# Patient Record
Sex: FEMALE | URBAN - METROPOLITAN AREA
[De-identification: names, ages, dates, MRNs, and addresses within clinical notes are randomized per-mention and may not be internally consistent; named-entity substitution may affect disease eponyms.]

---

## 2024-07-01 RX ORDER — CLINDAMYCIN PHOSPHATE 11.9 MG/ML
SOLUTION TOPICAL
COMMUNITY
End: 2024-07-03

## 2024-07-01 RX ORDER — VALACYCLOVIR HYDROCHLORIDE 500 MG/1
500 TABLET, FILM COATED ORAL 2 TIMES DAILY
COMMUNITY

## 2024-07-01 RX ORDER — FLUTICASONE PROPIONATE AND SALMETEROL 113; 14 UG/1; UG/1
POWDER, METERED RESPIRATORY (INHALATION)
COMMUNITY
End: 2024-07-03

## 2024-07-01 RX ORDER — BIMATOPROST 0.1 MG/ML
SOLUTION/ DROPS OPHTHALMIC
COMMUNITY
End: 2024-07-03

## 2024-07-01 RX ORDER — EFAVIRENZ, EMTRICITABINE AND TENOFOVIR DISOPROXIL FUMARATE 600; 200; 300 MG/1; MG/1; MG/1
TABLET, FILM COATED ORAL
COMMUNITY
End: 2024-07-03

## 2024-07-01 RX ORDER — MULTIVITAMIN,THER AND MINERALS
1 TABLET ORAL DAILY
COMMUNITY

## 2024-07-01 RX ORDER — TOPIRAMATE 25 MG/1
25 TABLET ORAL
COMMUNITY

## 2024-07-01 RX ORDER — TOPIRAMATE SPINKLE 25 MG/1
25 CAPSULE ORAL
COMMUNITY
End: 2024-07-03

## 2024-07-01 RX ORDER — LEVOTHYROXINE SODIUM 175 UG/1
1 TABLET ORAL DAILY
COMMUNITY

## 2024-07-01 RX ORDER — FLUTICASONE PROPIONATE 50 MCG
1 SPRAY, SUSPENSION (ML) NASAL DAILY
COMMUNITY
End: 2024-07-03

## 2024-07-01 RX ORDER — METOPROLOL SUCCINATE 50 MG/1
50 TABLET, EXTENDED RELEASE ORAL
COMMUNITY
End: 2024-07-03

## 2024-07-01 RX ORDER — ALPRAZOLAM 0.5 MG/1
1 TABLET ORAL
COMMUNITY

## 2024-07-01 RX ORDER — TOPIRAMATE 100 MG/1
TABLET, FILM COATED ORAL
COMMUNITY
Start: 2020-06-26 | End: 2024-07-03

## 2024-07-01 RX ORDER — SIMVASTATIN 20 MG
20 TABLET ORAL
COMMUNITY

## 2024-07-01 RX ORDER — ASPIRIN 81 MG/1
81 TABLET ORAL
COMMUNITY

## 2024-07-03 ENCOUNTER — OFFICE VISIT (OUTPATIENT)
Dept: GASTROENTEROLOGY | Facility: CLINIC | Age: 64
End: 2024-07-03
Payer: COMMERCIAL

## 2024-07-03 VITALS
WEIGHT: 226 LBS | BODY MASS INDEX: 38.58 KG/M2 | SYSTOLIC BLOOD PRESSURE: 122 MMHG | DIASTOLIC BLOOD PRESSURE: 70 MMHG | HEIGHT: 64 IN | HEART RATE: 52 BPM | TEMPERATURE: 96.8 F | OXYGEN SATURATION: 96 %

## 2024-07-03 DIAGNOSIS — R19.4 CHANGE IN BOWEL HABITS: Primary | ICD-10-CM

## 2024-07-03 DIAGNOSIS — R15.2 FECAL URGENCY: ICD-10-CM

## 2024-07-03 DIAGNOSIS — R12 HEARTBURN: ICD-10-CM

## 2024-07-03 DIAGNOSIS — R13.19 ESOPHAGEAL DYSPHAGIA: ICD-10-CM

## 2024-07-03 DIAGNOSIS — R14.0 BLOATING: ICD-10-CM

## 2024-07-03 PROCEDURE — 99203 OFFICE O/P NEW LOW 30 MIN: CPT | Performed by: PHYSICIAN ASSISTANT

## 2024-07-03 RX ORDER — AZITHROMYCIN 250 MG/1
TABLET, FILM COATED ORAL
COMMUNITY
Start: 2024-05-21

## 2024-07-03 NOTE — PROGRESS NOTES
[facility] - Outpatient Consultation  Suzanne Muse 63 y.o. female MRN: [id number]  Encounter: [id number]          ASSESSMENT AND PLAN:      1. Change in bowel habits  2. Bloating  3. Fecal urgency  - She presents with several months of a change in bowel habits with increased gas, gurgling, fecal urgency, and a mixture of loose bowel movements alternating with constipation as well as foul smelling urine and stool  - UA neg with PCP for UTI  - Trial probiotics  - Discussed possible rifaximin course given she has improved with a low FODMAP diet so her symptoms may be IBS related and seemed to start after having a stomach virus  - Schedule colonoscopy for evaluation given the changes in bowel habits    4. Heartburn  5. Esophageal dysphagia  - Plan for EGD for further evaluation of heartburn with dysphagia at times to pills and solids, also rule out celiac disease given above symptoms  - PPI course pending EGD results      ______________________________________________________________________    HPI:  Suzanne is a 62 yo F with a PMH of HIV, HTN, HLD, presenting for evaluation of change in bowel habits over the past several months with more gurgling and noises in her abdomen with increased gas, episodes of urgency with a mixture between some constipation and loose stools, and a foul smell to her urine and stool.  She reports that she is generally dealt with constipation since 2019 when she had a hernia repair but then noticed these changes several months ago.  She denies any black or bloody bowel movements.  She told her niece about her symptoms and she told her that it sounded like IBS so she told her to try the low FODMAP diet which did really reduce her symptoms but as soon as she ate anything that was not in the low FODMAP side her symptoms recurred.  She reports her last colonoscopy was in 2020 and she is due in 2025 due to a history of polyps.  She has lost 20 pounds but this is  intentional with doing a low FODMAP diet and with portion control.  She also reports history of heartburn and has been having dysphagia at times with a globus sensation.  She never had an upper endoscopy in the past.      REVIEW OF SYSTEMS:    CONSTITUTIONAL: Denies any fever, chills, rigors, and weight loss.  HEENT: No earache or tinnitus. Denies hearing loss or visual disturbances.  CARDIOVASCULAR: No chest pain or palpitations.   RESPIRATORY: Denies any cough, hemoptysis, shortness of breath or dyspnea on exertion.  GASTROINTESTINAL: As noted in the History of Present Illness.   GENITOURINARY: No problems with urination. Denies any hematuria or dysuria.  NEUROLOGIC: No dizziness or vertigo, denies headaches.   MUSCULOSKELETAL: Denies any muscle or joint pain.   SKIN: Denies skin rashes or itching.   ENDOCRINE: Denies excessive thirst. Denies intolerance to heat or cold.  PSYCHOSOCIAL: Denies depression or anxiety. Denies any recent memory loss.       Historical Information   Past Medical History:   Diagnosis Date    Asthma     High blood pressure     High cholesterol     HIV (human immunodeficiency virus infection) (HCC)     Migraine      Past Surgical History:   Procedure Laterality Date    INGUINAL HERNIA REPAIR  2013    2019 had surgery to fix the 2013 surgery     Social History   Social History     Substance and Sexual Activity   Alcohol Use Not Currently    Comment: many years ago     Social History     Substance and Sexual Activity   Drug Use Yes     Social History     Tobacco Use   Smoking Status Former    Types: Cigarettes    Passive exposure: Never   Smokeless Tobacco Never     Family History   Problem Relation Age of Onset    Breast cancer Mother     Stroke Mother     Diabetes Mother     Heart attack Mother        Meds/Allergies       Current Outpatient Medications:     ALPRAZolam (XANAX) 0.5 mg tablet    aspirin (ECOTRIN LOW STRENGTH) 81 mg EC tablet    azithromycin (ZITHROMAX) 250 mg tablet     "bictegravir-emtricitab-tenofovir alafenamide (BIKTARVY) -25 MG tablet    levothyroxine 175 mcg tablet    Metoprolol-HCTZ ER 50-12.5 MG TB24    simvastatin (ZOCOR) 20 mg tablet    Simvastatin 20 MG/5ML SUSP    topiramate (TOPAMAX) 25 mg tablet    valACYclovir (VALTREX) 500 mg tablet    Vitamins/Minerals TABS    No Known Allergies        Objective     Blood pressure 122/70, pulse (!) 52, temperature (!) 96.8 °F (36 °C), temperature source Temporal, height 5' 4\" (1.626 m), weight 103 kg (226 lb), SpO2 96%. Body mass index is 38.79 kg/m².        PHYSICAL EXAM:      General Appearance:   Alert, cooperative, no distress   HEENT:   Normocephalic, atraumatic, anicteric.     Neck:  Supple, symmetrical, trachea midline   Lungs:   Clear to auscultation bilaterally; no rales, rhonchi or wheezing; respirations unlabored    Heart::   Regular rate and rhythm; no murmur, rub, or gallop.   Abdomen:   Soft, non-tender, non-distended; normal bowel sounds; no masses, no organomegaly    Genitalia:   Deferred    Rectal:   Deferred    Extremities:  No cyanosis, clubbing or edema    Pulses:  2+ and symmetric    Skin:  No jaundice, rashes, or lesions    Lymph nodes:  No palpable cervical lymphadenopathy        Lab Results:   No visits with results within 1 Day(s) from this visit.   Latest known visit with results is:   No results found for any previous visit.         Radiology Results:   No results found.  "

## 2024-07-03 NOTE — PATIENT INSTRUCTIONS
Scheduled date of EGD/colonoscopy (as of today):7/29/24  Physician performing EGD/colonoscopy:Hieu  Location of EGD/colonoscopy:[facility]  Desired bowel prep reviewed with patient:Raimundo/Miralax  Instructions reviewed with patient by:Forrest fenton  Clearances:   none

## 2024-07-03 NOTE — LETTER
July 8, 2024     Joe Najjar, MD  [address] [place] [address]    Patient: Suzanne Muse   YOB: 1960   Date of Visit: 7/3/2024       Dear Dr. Najjar:    Thank you for referring Suzanne Muse to me for evaluation. Below are my notes for this consultation.    If you have questions, please do not hesitate to call me. I look forward to following your patient along with you.         Sincerely,        Lynette Unger PA-C        CC: No Recipients    Lynette Unger PA-C  7/3/2024  3:26 PM  Signed  [facility] - Outpatient Consultation  Suzanne Muse 63 y.o. female MRN: [id number]  Encounter: [id number]          ASSESSMENT AND PLAN:      1. Change in bowel habits  2. Bloating  3. Fecal urgency  - She presents with several months of a change in bowel habits with increased gas, gurgling, fecal urgency, and a mixture of loose bowel movements alternating with constipation as well as foul smelling urine and stool  - UA neg with PCP for UTI  - Trial probiotics  - Discussed possible rifaximin course given she has improved with a low FODMAP diet so her symptoms may be IBS related and seemed to start after having a stomach virus  - Schedule colonoscopy for evaluation given the changes in bowel habits    4. Heartburn  5. Esophageal dysphagia  - Plan for EGD for further evaluation of heartburn with dysphagia at times to pills and solids, also rule out celiac disease given above symptoms  - PPI course pending EGD results      ______________________________________________________________________    HPI:  Suzanne is a 64 yo F with a PMH of HIV, HTN, HLD, presenting for evaluation of change in bowel habits over the past several months with more gurgling and noises in her abdomen with increased gas, episodes of urgency with a mixture between some constipation and loose stools, and a foul smell to her urine and stool.  She reports that she is generally dealt with constipation since 2019  when she had a hernia repair but then noticed these changes several months ago.  She denies any black or bloody bowel movements.  She told her niece about her symptoms and she told her that it sounded like IBS so she told her to try the low FODMAP diet which did really reduce her symptoms but as soon as she ate anything that was not in the low FODMAP side her symptoms recurred.  She reports her last colonoscopy was in 2020 and she is due in 2025 due to a history of polyps.  She has lost 20 pounds but this is intentional with doing a low FODMAP diet and with portion control.  She also reports history of heartburn and has been having dysphagia at times with a globus sensation.  She never had an upper endoscopy in the past.      REVIEW OF SYSTEMS:    CONSTITUTIONAL: Denies any fever, chills, rigors, and weight loss.  HEENT: No earache or tinnitus. Denies hearing loss or visual disturbances.  CARDIOVASCULAR: No chest pain or palpitations.   RESPIRATORY: Denies any cough, hemoptysis, shortness of breath or dyspnea on exertion.  GASTROINTESTINAL: As noted in the History of Present Illness.   GENITOURINARY: No problems with urination. Denies any hematuria or dysuria.  NEUROLOGIC: No dizziness or vertigo, denies headaches.   MUSCULOSKELETAL: Denies any muscle or joint pain.   SKIN: Denies skin rashes or itching.   ENDOCRINE: Denies excessive thirst. Denies intolerance to heat or cold.  PSYCHOSOCIAL: Denies depression or anxiety. Denies any recent memory loss.       Historical Information  Past Medical History:   Diagnosis Date   • Asthma    • High blood pressure    • High cholesterol    • HIV (human immunodeficiency virus infection) (HCC)    • Migraine      Past Surgical History:   Procedure Laterality Date   • INGUINAL HERNIA REPAIR  2013    2019 had surgery to fix the 2013 surgery     Social History  Social History     Substance and Sexual Activity   Alcohol Use Not Currently    Comment: many years ago     Social History  "    Substance and Sexual Activity   Drug Use Yes     Social History     Tobacco Use   Smoking Status Former   • Types: Cigarettes   • Passive exposure: Never   Smokeless Tobacco Never     Family History   Problem Relation Age of Onset   • Breast cancer Mother    • Stroke Mother    • Diabetes Mother    • Heart attack Mother        Meds/Allergies      Current Outpatient Medications:   •  ALPRAZolam (XANAX) 0.5 mg tablet  •  aspirin (ECOTRIN LOW STRENGTH) 81 mg EC tablet  •  azithromycin (ZITHROMAX) 250 mg tablet  •  bictegravir-emtricitab-tenofovir alafenamide (BIKTARVY) -25 MG tablet  •  levothyroxine 175 mcg tablet  •  Metoprolol-HCTZ ER 50-12.5 MG TB24  •  simvastatin (ZOCOR) 20 mg tablet  •  Simvastatin 20 MG/5ML SUSP  •  topiramate (TOPAMAX) 25 mg tablet  •  valACYclovir (VALTREX) 500 mg tablet  •  Vitamins/Minerals TABS    No Known Allergies        Objective    Blood pressure 122/70, pulse (!) 52, temperature (!) 96.8 °F (36 °C), temperature source Temporal, height 5' 4\" (1.626 m), weight 103 kg (226 lb), SpO2 96%. Body mass index is 38.79 kg/m².        PHYSICAL EXAM:      General Appearance:   Alert, cooperative, no distress   HEENT:   Normocephalic, atraumatic, anicteric.     Neck:  Supple, symmetrical, trachea midline   Lungs:   Clear to auscultation bilaterally; no rales, rhonchi or wheezing; respirations unlabored    Heart::   Regular rate and rhythm; no murmur, rub, or gallop.   Abdomen:   Soft, non-tender, non-distended; normal bowel sounds; no masses, no organomegaly    Genitalia:   Deferred    Rectal:   Deferred    Extremities:  No cyanosis, clubbing or edema    Pulses:  2+ and symmetric    Skin:  No jaundice, rashes, or lesions    Lymph nodes:  No palpable cervical lymphadenopathy        Lab Results:   No visits with results within 1 Day(s) from this visit.   Latest known visit with results is:   No results found for any previous visit.         Radiology Results:   No results found.  "

## 2024-07-03 NOTE — H&P (VIEW-ONLY)
[facility] - Outpatient Consultation  Suzanne Muse 63 y.o. female MRN: [id number]  Encounter: [id number]          ASSESSMENT AND PLAN:      1. Change in bowel habits  2. Bloating  3. Fecal urgency  - She presents with several months of a change in bowel habits with increased gas, gurgling, fecal urgency, and a mixture of loose bowel movements alternating with constipation as well as foul smelling urine and stool  - UA neg with PCP for UTI  - Trial probiotics  - Discussed possible rifaximin course given she has improved with a low FODMAP diet so her symptoms may be IBS related and seemed to start after having a stomach virus  - Schedule colonoscopy for evaluation given the changes in bowel habits    4. Heartburn  5. Esophageal dysphagia  - Plan for EGD for further evaluation of heartburn with dysphagia at times to pills and solids, also rule out celiac disease given above symptoms  - PPI course pending EGD results      ______________________________________________________________________    HPI:  Suzanne is a 64 yo F with a PMH of HIV, HTN, HLD, presenting for evaluation of change in bowel habits over the past several months with more gurgling and noises in her abdomen with increased gas, episodes of urgency with a mixture between some constipation and loose stools, and a foul smell to her urine and stool.  She reports that she is generally dealt with constipation since 2019 when she had a hernia repair but then noticed these changes several months ago.  She denies any black or bloody bowel movements.  She told her niece about her symptoms and she told her that it sounded like IBS so she told her to try the low FODMAP diet which did really reduce her symptoms but as soon as she ate anything that was not in the low FODMAP side her symptoms recurred.  She reports her last colonoscopy was in 2020 and she is due in 2025 due to a history of polyps.  She has lost 20 pounds but this is  intentional with doing a low FODMAP diet and with portion control.  She also reports history of heartburn and has been having dysphagia at times with a globus sensation.  She never had an upper endoscopy in the past.      REVIEW OF SYSTEMS:    CONSTITUTIONAL: Denies any fever, chills, rigors, and weight loss.  HEENT: No earache or tinnitus. Denies hearing loss or visual disturbances.  CARDIOVASCULAR: No chest pain or palpitations.   RESPIRATORY: Denies any cough, hemoptysis, shortness of breath or dyspnea on exertion.  GASTROINTESTINAL: As noted in the History of Present Illness.   GENITOURINARY: No problems with urination. Denies any hematuria or dysuria.  NEUROLOGIC: No dizziness or vertigo, denies headaches.   MUSCULOSKELETAL: Denies any muscle or joint pain.   SKIN: Denies skin rashes or itching.   ENDOCRINE: Denies excessive thirst. Denies intolerance to heat or cold.  PSYCHOSOCIAL: Denies depression or anxiety. Denies any recent memory loss.       Historical Information   Past Medical History:   Diagnosis Date    Asthma     High blood pressure     High cholesterol     HIV (human immunodeficiency virus infection) (HCC)     Migraine      Past Surgical History:   Procedure Laterality Date    INGUINAL HERNIA REPAIR  2013    2019 had surgery to fix the 2013 surgery     Social History   Social History     Substance and Sexual Activity   Alcohol Use Not Currently    Comment: many years ago     Social History     Substance and Sexual Activity   Drug Use Yes     Social History     Tobacco Use   Smoking Status Former    Types: Cigarettes    Passive exposure: Never   Smokeless Tobacco Never     Family History   Problem Relation Age of Onset    Breast cancer Mother     Stroke Mother     Diabetes Mother     Heart attack Mother        Meds/Allergies       Current Outpatient Medications:     ALPRAZolam (XANAX) 0.5 mg tablet    aspirin (ECOTRIN LOW STRENGTH) 81 mg EC tablet    azithromycin (ZITHROMAX) 250 mg tablet     "bictegravir-emtricitab-tenofovir alafenamide (BIKTARVY) -25 MG tablet    levothyroxine 175 mcg tablet    Metoprolol-HCTZ ER 50-12.5 MG TB24    simvastatin (ZOCOR) 20 mg tablet    Simvastatin 20 MG/5ML SUSP    topiramate (TOPAMAX) 25 mg tablet    valACYclovir (VALTREX) 500 mg tablet    Vitamins/Minerals TABS    No Known Allergies        Objective     Blood pressure 122/70, pulse (!) 52, temperature (!) 96.8 °F (36 °C), temperature source Temporal, height 5' 4\" (1.626 m), weight 103 kg (226 lb), SpO2 96%. Body mass index is 38.79 kg/m².        PHYSICAL EXAM:      General Appearance:   Alert, cooperative, no distress   HEENT:   Normocephalic, atraumatic, anicteric.     Neck:  Supple, symmetrical, trachea midline   Lungs:   Clear to auscultation bilaterally; no rales, rhonchi or wheezing; respirations unlabored    Heart::   Regular rate and rhythm; no murmur, rub, or gallop.   Abdomen:   Soft, non-tender, non-distended; normal bowel sounds; no masses, no organomegaly    Genitalia:   Deferred    Rectal:   Deferred    Extremities:  No cyanosis, clubbing or edema    Pulses:  2+ and symmetric    Skin:  No jaundice, rashes, or lesions    Lymph nodes:  No palpable cervical lymphadenopathy        Lab Results:   No visits with results within 1 Day(s) from this visit.   Latest known visit with results is:   No results found for any previous visit.         Radiology Results:   No results found.  "

## 2024-07-10 ENCOUNTER — TELEPHONE (OUTPATIENT)
Age: 64
End: 2024-07-10

## 2024-07-10 DIAGNOSIS — K58.0 IRRITABLE BOWEL SYNDROME WITH DIARRHEA: Primary | ICD-10-CM

## 2024-07-10 NOTE — TELEPHONE ENCOUNTER
Closed    Close reason: Prior Authorization not required for patient/medication  Payer: [employer]    [phone]  Note from payer: Drug is covered by current benefit plan. No further PA activity needed  View History  Medication Being Authorized    rifaximin (XIFAXAN) 550 mg tablet  Take 1 tablet (550 mg total) by mouth every 8 (eight) hours for 14 days  Dispense: 42 tablet Refills: 0   Start: 7/10/2024 End: 7/24/2024   Class: Normal Diagnoses: Irritable bowel syndrome with diarrhea   This order has been released to its destination.  To be filled at: [id number] - [place] - [phone]

## 2024-07-10 NOTE — TELEPHONE ENCOUNTER
----- Message from Lynette Unger PA-C sent at 7/10/2024  2:05 PM EDT -----  I sent rifaximin for her for treatment of IBS-D, can we see if this needs a prior auth? Thanks!

## 2024-07-29 ENCOUNTER — HOSPITAL ENCOUNTER (OUTPATIENT)
Dept: GASTROENTEROLOGY | Facility: HOSPITAL | Age: 64
Setting detail: OUTPATIENT SURGERY
Discharge: HOME | End: 2024-07-29
Payer: COMMERCIAL

## 2024-07-29 ENCOUNTER — ANESTHESIA (OUTPATIENT)
Dept: GASTROENTEROLOGY | Facility: HOSPITAL | Age: 64
End: 2024-07-29

## 2024-07-29 ENCOUNTER — ANESTHESIA EVENT (OUTPATIENT)
Dept: GASTROENTEROLOGY | Facility: HOSPITAL | Age: 64
End: 2024-07-29

## 2024-07-29 VITALS
HEART RATE: 44 BPM | HEIGHT: 64 IN | BODY MASS INDEX: 36.88 KG/M2 | SYSTOLIC BLOOD PRESSURE: 146 MMHG | WEIGHT: 216 LBS | DIASTOLIC BLOOD PRESSURE: 72 MMHG | RESPIRATION RATE: 17 BRPM | TEMPERATURE: 97.9 F | OXYGEN SATURATION: 99 %

## 2024-07-29 DIAGNOSIS — R19.4 CHANGE IN BOWEL HABITS: ICD-10-CM

## 2024-07-29 DIAGNOSIS — R12 HEARTBURN: ICD-10-CM

## 2024-07-29 DIAGNOSIS — R15.2 FECAL URGENCY: ICD-10-CM

## 2024-07-29 DIAGNOSIS — R14.0 BLOATING: ICD-10-CM

## 2024-07-29 DIAGNOSIS — R13.19 ESOPHAGEAL DYSPHAGIA: ICD-10-CM

## 2024-07-29 LAB
ATRIAL RATE: 41 BPM
P AXIS: 70 DEGREES
PR INTERVAL: 170 MS
QRS AXIS: -17 DEGREES
QRSD INTERVAL: 100 MS
QT INTERVAL: 456 MS
QTC INTERVAL: 415 MS
T WAVE AXIS: 73 DEGREES
VENTRICULAR RATE: 50 BPM

## 2024-07-29 PROCEDURE — 93005 ELECTROCARDIOGRAM TRACING: CPT

## 2024-07-29 PROCEDURE — 88305 TISSUE EXAM BY PATHOLOGIST: CPT | Performed by: PATHOLOGY

## 2024-07-29 PROCEDURE — 93010 ELECTROCARDIOGRAM REPORT: CPT | Performed by: INTERNAL MEDICINE

## 2024-07-29 PROCEDURE — 43450 DILATE ESOPHAGUS 1/MULT PASS: CPT | Performed by: INTERNAL MEDICINE

## 2024-07-29 PROCEDURE — 43239 EGD BIOPSY SINGLE/MULTIPLE: CPT | Performed by: INTERNAL MEDICINE

## 2024-07-29 PROCEDURE — 45380 COLONOSCOPY AND BIOPSY: CPT | Performed by: INTERNAL MEDICINE

## 2024-07-29 RX ORDER — SODIUM CHLORIDE, SODIUM LACTATE, POTASSIUM CHLORIDE, CALCIUM CHLORIDE 600; 310; 30; 20 MG/100ML; MG/100ML; MG/100ML; MG/100ML
125 INJECTION, SOLUTION INTRAVENOUS CONTINUOUS
Status: DISCONTINUED | OUTPATIENT
Start: 2024-07-29 | End: 2024-08-02 | Stop reason: HOSPADM

## 2024-07-29 RX ORDER — LIDOCAINE HYDROCHLORIDE 10 MG/ML
INJECTION, SOLUTION EPIDURAL; INFILTRATION; INTRACAUDAL; PERINEURAL AS NEEDED
Status: DISCONTINUED | OUTPATIENT
Start: 2024-07-29 | End: 2024-07-29

## 2024-07-29 RX ORDER — GLYCOPYRROLATE 0.2 MG/ML
INJECTION INTRAMUSCULAR; INTRAVENOUS AS NEEDED
Status: DISCONTINUED | OUTPATIENT
Start: 2024-07-29 | End: 2024-07-29

## 2024-07-29 RX ORDER — PROPOFOL 10 MG/ML
INJECTION, EMULSION INTRAVENOUS AS NEEDED
Status: DISCONTINUED | OUTPATIENT
Start: 2024-07-29 | End: 2024-07-29

## 2024-07-29 RX ADMIN — PROPOFOL 25 MG: 10 INJECTION, EMULSION INTRAVENOUS at 10:19

## 2024-07-29 RX ADMIN — GLYCOPYRROLATE 0.2 MG: 0.2 INJECTION INTRAMUSCULAR; INTRAVENOUS at 10:30

## 2024-07-29 RX ADMIN — SODIUM CHLORIDE, SODIUM LACTATE, POTASSIUM CHLORIDE, AND CALCIUM CHLORIDE 125 ML/HR: .6; .31; .03; .02 INJECTION, SOLUTION INTRAVENOUS at 10:09

## 2024-07-29 RX ADMIN — PROPOFOL 25 MG: 10 INJECTION, EMULSION INTRAVENOUS at 10:21

## 2024-07-29 RX ADMIN — LIDOCAINE HYDROCHLORIDE 100 MG: 10 INJECTION, SOLUTION EPIDURAL; INFILTRATION; INTRACAUDAL; PERINEURAL at 10:17

## 2024-07-29 RX ADMIN — PROPOFOL 25 MG: 10 INJECTION, EMULSION INTRAVENOUS at 10:24

## 2024-07-29 RX ADMIN — PROPOFOL 25 MG: 10 INJECTION, EMULSION INTRAVENOUS at 10:27

## 2024-07-29 RX ADMIN — PROPOFOL 25 MG: 10 INJECTION, EMULSION INTRAVENOUS at 10:32

## 2024-07-29 RX ADMIN — PROPOFOL 150 MG: 10 INJECTION, EMULSION INTRAVENOUS at 10:17

## 2024-07-29 RX ADMIN — PROPOFOL 25 MG: 10 INJECTION, EMULSION INTRAVENOUS at 10:30

## 2024-07-29 NOTE — INTERVAL H&P NOTE
H&P reviewed. After examining the patient I find no changes in the patients condition since the H&P had been written.    Vitals:    07/29/24 0957   BP: (!) 187/81   Pulse: (!) 50   Temp: 97.5 °F (36.4 °C)   SpO2: 98%

## 2024-07-29 NOTE — ANESTHESIA POSTPROCEDURE EVALUATION
Post-Op Assessment Note    CV Status:  Stable    Pain management: adequate       Mental Status:  Alert and awake   Hydration Status:  Euvolemic   PONV Controlled:  Controlled   Airway Patency:  Patent     Post Op Vitals Reviewed: Yes    No anethesia notable event occurred.                /55 (07/29/24 1045)    Temp 97.9 °F (36.6 °C) (07/29/24 1045)    Pulse (!) 46 (07/29/24 1045)   Resp 18 (07/29/24 1045)    SpO2 99 % (07/29/24 1045)

## 2024-07-29 NOTE — ANESTHESIA PREPROCEDURE EVALUATION
Procedure:  COLONOSCOPY  EGD    HIV  Beta blocker yesterday  Hx of AVR  Not on AC  Relevant Problems   No relevant active problems        Physical Exam    Airway    Mallampati score: III  TM Distance: >3 FB       Dental   No notable dental hx     Cardiovascular  Cardiovascular exam normal    Pulmonary  Pulmonary exam normal     Other Findings  post-pubertal.      Anesthesia Plan  ASA Score- 3     Anesthesia Type- IV sedation with anesthesia with ASA Monitors.         Additional Monitors:     Airway Plan:            Plan Factors-    Chart reviewed.    Patient summary reviewed.    Patient is not a current smoker.              Induction- intravenous.    Postoperative Plan-         Informed Consent- Anesthetic plan and risks discussed with patient.  I personally reviewed this patient with the CRNA. Discussed and agreed on the Anesthesia Plan with the CRNA..

## 2024-07-30 ENCOUNTER — NURSE TRIAGE (OUTPATIENT)
Age: 64
End: 2024-07-30

## 2024-07-30 DIAGNOSIS — K21.00 GASTROESOPHAGEAL REFLUX DISEASE WITH ESOPHAGITIS WITHOUT HEMORRHAGE: Primary | ICD-10-CM

## 2024-07-30 RX ORDER — OMEPRAZOLE 40 MG/1
40 CAPSULE, DELAYED RELEASE ORAL DAILY
Qty: 30 CAPSULE | Refills: 3 | Status: SHIPPED | OUTPATIENT
Start: 2024-07-30

## 2024-07-30 NOTE — TELEPHONE ENCOUNTER
Patients GI provider:  Dr. Hagen    Number to return call: [phone]     Reason for call: Pt called and stated Dr Hagen was going to send medication for her stomach I did not see any orders yet please review thank you     Scheduled procedure/appointment date if applicable: n/a

## 2024-08-01 PROCEDURE — 88305 TISSUE EXAM BY PATHOLOGIST: CPT | Performed by: PATHOLOGY

## 2024-08-01 NOTE — TELEPHONE ENCOUNTER
"Reports \"weakness\" heaviness in lower abdomen. Pt admits to bending and squatting after procedure. Pt wearing hernia brace for support. Not having pain; just discomfort 5/10 when standing. Unsure if gas. Has not had full BM since procedure. Having small BMs \"kind of hard\". Also states not hydrating well.    Denies nausea, vomiting, fever, dark or bloody stools. Pt advised to hydrate with 64 oz fluids/day, try stool softener and Miralax capful daily to BID. Pt to call if no improvement.    Reason for Disposition   Abdominal cramping and bloating after colonoscopy, questions about    Answer Assessment - Initial Assessment Questions  1. DATE/TIME: \"When did you have your colonoscopy?\"       7/29/24  2. MAIN CONCERN: \"What is your main concern right now?\" \"What questions do you have?\"      Abdominal heaviness/weakness   3. ABDOMEN PAIN: \"Are you having any abdomen (belly or stomach) pain?\" If Yes, ask: \"How bad is it?\" (e.g., Scale 1-10; mild, moderate, severe).     - MILD (1-3): doesn't interfere with normal activities, abdomen soft and not tender to touch      - MODERATE (4-7): interferes with normal activities or awakens from sleep, tender to touch      - SEVERE (8-10): excruciating pain, doubled over, unable to do any normal activities        5/10 when standing  4. OTHER SYMPTOMS: \"What other symptoms are you having?\" (e.g., rectal bleeding, bloating or feeling gassy, passing gas, vomiting, dizziness, fever).      Possible constipation  5. ONSET: \"When did your symptoms start?\"      today  6. PATTERN: \"Is the symptom(s) constant or does it come and go?\" \"Is your symptom(s) getting worse, better, or staying the same?\"      intermittent    Protocols used: Colonoscopy Symptoms and Questions-ADULT-    "

## 2024-08-01 NOTE — TELEPHONE ENCOUNTER
Pt calling to advise she is having pain in lower stomach area. Would like to know if that is normal, had colonoscopy/EGD on 07/29. Transferred to [facility] in triage for further assistance

## 2024-08-06 ENCOUNTER — TELEPHONE (OUTPATIENT)
Dept: GASTROENTEROLOGY | Facility: CLINIC | Age: 64
End: 2024-08-06

## 2024-08-06 NOTE — TELEPHONE ENCOUNTER
Pt called. She asked if we can send Colon/EGD report to her PCP Dr. Joe Najjar Fax # [phone]    Faxed!